# Patient Record
Sex: FEMALE | Race: OTHER | Employment: FULL TIME | URBAN - METROPOLITAN AREA
[De-identification: names, ages, dates, MRNs, and addresses within clinical notes are randomized per-mention and may not be internally consistent; named-entity substitution may affect disease eponyms.]

---

## 2017-05-16 LAB
AMB EXT HEMATOCRIT: 36.9
AMB EXT HEMOGLOBIN: 12.5
AMB EXT MCV: 92
AMB EXT PLATELETS: 273
ANTIBODY SCREEN OB: NEGATIVE
HEPATITIS B SURFACE ANTIGEN OB: NEGATIVE
HIV RESULT OB: NEGATIVE
RAPID PLASMA REAGIN OB: NONREACTIVE
RH FACTOR OB: POSITIVE

## 2017-09-13 ENCOUNTER — HOSPITAL ENCOUNTER (OUTPATIENT)
Facility: HOSPITAL | Age: 32
Setting detail: OBSERVATION
Discharge: HOME | DRG: 780 | End: 2017-09-14
Attending: OBSTETRICS & GYNECOLOGY | Admitting: OBSTETRICS & GYNECOLOGY
Payer: MEDICAID

## 2017-09-13 PROBLEM — O46.93 VAGINAL BLEEDING IN PREGNANCY, THIRD TRIMESTER: Status: ACTIVE | Noted: 2017-09-13

## 2017-09-13 PROBLEM — O47.03 THREATENED PREMATURE LABOR IN THIRD TRIMESTER: Status: ACTIVE | Noted: 2017-09-13

## 2017-09-13 RX ORDER — SODIUM CHLORIDE, SODIUM LACTATE, POTASSIUM CHLORIDE, CALCIUM CHLORIDE 600; 310; 30; 20 MG/100ML; MG/100ML; MG/100ML; MG/100ML
INJECTION, SOLUTION INTRAVENOUS
Status: COMPLETED
Start: 2017-09-13 | End: 2017-09-13

## 2017-09-13 RX ORDER — SODIUM CHLORIDE 0.9 % (FLUSH) 0.9 %
10 SYRINGE (ML) INJECTION AS NEEDED
Status: DISCONTINUED | OUTPATIENT
Start: 2017-09-13 | End: 2017-09-14

## 2017-09-13 RX ORDER — TERBUTALINE SULFATE 1 MG/ML
0.25 INJECTION, SOLUTION SUBCUTANEOUS AS NEEDED
Status: DISCONTINUED | OUTPATIENT
Start: 2017-09-13 | End: 2017-09-14

## 2017-09-13 RX ORDER — SODIUM CHLORIDE 9 MG/ML
INJECTION, SOLUTION INTRAVENOUS CONTINUOUS
Status: DISCONTINUED | OUTPATIENT
Start: 2017-09-13 | End: 2017-09-13

## 2017-09-13 RX ORDER — DEXTROSE, SODIUM CHLORIDE, SODIUM LACTATE, POTASSIUM CHLORIDE, AND CALCIUM CHLORIDE 5; .6; .31; .03; .02 G/100ML; G/100ML; G/100ML; G/100ML; G/100ML
125 INJECTION, SOLUTION INTRAVENOUS CONTINUOUS
Status: DISCONTINUED | OUTPATIENT
Start: 2017-09-14 | End: 2017-09-14

## 2017-09-14 VITALS
DIASTOLIC BLOOD PRESSURE: 49 MMHG | HEART RATE: 72 BPM | SYSTOLIC BLOOD PRESSURE: 109 MMHG | RESPIRATION RATE: 18 BRPM | TEMPERATURE: 98 F

## 2017-09-14 LAB
APTT PPP: 31.4 SECONDS (ref 23.2–35.3)
BILIRUB UR QL: NEGATIVE
CLARITY UR: CLEAR
COLOR UR: YELLOW
ERYTHROCYTE [DISTWIDTH] IN BLOOD BY AUTOMATED COUNT: 12.8 % (ref 11–15)
FIBRINOGEN PPP-MCNC: 391 MG/DL (ref 176–491)
FIBRONECTIN FETAL SPEC QL: NEGATIVE
FSP PPP LA-ACNC: NEGATIVE MCG/ML
GLUCOSE UR-MCNC: NEGATIVE MG/DL
HCT VFR BLD AUTO: 35.6 % (ref 35–48)
HGB BLD-MCNC: 11.9 G/DL (ref 12–16)
INR BLD: 1 (ref 0.9–1.2)
KETONES UR-MCNC: NEGATIVE MG/DL
MCH RBC QN AUTO: 31.8 PG (ref 27–32)
MCHC RBC AUTO-ENTMCNC: 33.4 G/DL (ref 32–37)
MCV RBC AUTO: 95.2 FL (ref 80–100)
NITRITE UR QL STRIP.AUTO: NEGATIVE
PH UR: 7 [PH] (ref 5–8)
PLATELET # BLD AUTO: 218 K/UL (ref 140–400)
PMV BLD AUTO: 9.1 FL (ref 7.4–10.3)
PROT UR-MCNC: NEGATIVE MG/DL
PROTHROMBIN TIME: 12.4 SECONDS (ref 11.8–14.5)
RBC # BLD AUTO: 3.74 M/UL (ref 3.7–5.4)
RBC #/AREA URNS AUTO: 3 /HPF
SP GR UR STRIP: 1.01 (ref 1–1.03)
UROBILINOGEN UR STRIP-ACNC: <2
VIT C UR-MCNC: NEGATIVE MG/DL
WBC # BLD AUTO: 10.9 K/UL (ref 4–11)
WBC #/AREA URNS AUTO: 4 /HPF

## 2017-09-14 PROCEDURE — 87653 STREP B DNA AMP PROBE: CPT | Performed by: OBSTETRICS & GYNECOLOGY

## 2017-09-14 PROCEDURE — 82731 ASSAY OF FETAL FIBRONECTIN: CPT | Performed by: OBSTETRICS & GYNECOLOGY

## 2017-09-14 PROCEDURE — 81001 URINALYSIS AUTO W/SCOPE: CPT | Performed by: OBSTETRICS & GYNECOLOGY

## 2017-09-14 PROCEDURE — 87086 URINE CULTURE/COLONY COUNT: CPT | Performed by: OBSTETRICS & GYNECOLOGY

## 2017-09-14 PROCEDURE — 85384 FIBRINOGEN ACTIVITY: CPT | Performed by: OBSTETRICS & GYNECOLOGY

## 2017-09-14 PROCEDURE — 87081 CULTURE SCREEN ONLY: CPT | Performed by: OBSTETRICS & GYNECOLOGY

## 2017-09-14 PROCEDURE — 85027 COMPLETE CBC AUTOMATED: CPT | Performed by: OBSTETRICS & GYNECOLOGY

## 2017-09-14 PROCEDURE — 85610 PROTHROMBIN TIME: CPT | Performed by: OBSTETRICS & GYNECOLOGY

## 2017-09-14 PROCEDURE — 85362 FIBRIN DEGRADATION PRODUCTS: CPT | Performed by: OBSTETRICS & GYNECOLOGY

## 2017-09-14 PROCEDURE — 85730 THROMBOPLASTIN TIME PARTIAL: CPT | Performed by: OBSTETRICS & GYNECOLOGY

## 2017-09-14 RX ORDER — SULFAMETHOXAZOLE AND TRIMETHOPRIM 800; 160 MG/1; MG/1
1 TABLET ORAL 2 TIMES DAILY
Qty: 6 TABLET | Refills: 0 | Status: ON HOLD | OUTPATIENT
Start: 2017-09-14 | End: 2017-11-23

## 2017-09-14 NOTE — H&P
[facility]    History & Physical    Brent Ambrosio Patient Status:  Observation    3/7/1985 MRN [id number]   Location [address]  Attending Beather Cooks, MD   Hosp Day # 0 PCP No primary care provider on file. abnormal masses, fundus soft, nontender    28 weeks size. Extremities: Calves -- nontender   Neuro:    Cervix: Speculum exam: blood noted on labia, no laceration/lesion. No blood noted in vagina.  FFN and GBS done  SVE: dilation FT ext os, int os not felt

## 2017-09-14 NOTE — PROGRESS NOTES
Dr. Mathew Jennings evaluated patient; order received for discharge; no active or visual bleeding noted upon examination per Dr. Mathew Jennings. Instructions given on  precautions.

## 2017-09-14 NOTE — PROGRESS NOTES
[facility]    Labor Progress Note    Ching Silveira Patient Status:  Inpatient    3/7/1985 MRN [id number]   Location [address]  Attending Suhas Acosta MD   Hosp Day # 0 PCP No primary care provider on file.

## 2017-11-23 ENCOUNTER — HOSPITAL ENCOUNTER (OUTPATIENT)
Facility: HOSPITAL | Age: 32
Discharge: HOME | End: 2017-11-23
Attending: OBSTETRICS & GYNECOLOGY | Admitting: OBSTETRICS & GYNECOLOGY
Payer: MEDICAID

## 2017-11-23 VITALS
HEART RATE: 71 BPM | TEMPERATURE: 98 F | HEIGHT: 65 IN | SYSTOLIC BLOOD PRESSURE: 114 MMHG | BODY MASS INDEX: 25.33 KG/M2 | WEIGHT: 152 LBS | RESPIRATION RATE: 18 BRPM | DIASTOLIC BLOOD PRESSURE: 64 MMHG

## 2017-11-23 PROBLEM — Z34.90 PREGNANCY: Status: ACTIVE | Noted: 2017-11-23

## 2017-11-23 PROCEDURE — 59025 FETAL NON-STRESS TEST: CPT

## 2017-11-23 PROCEDURE — 99211 OFF/OP EST MAY X REQ PHY/QHP: CPT

## 2017-11-23 RX ORDER — LEVOTHYROXINE SODIUM 0.03 MG/1
25 TABLET ORAL
COMMUNITY

## 2017-11-23 NOTE — TRIAGE
[facility]      Triage Note    Mali Garcia Patient Status:  Outpatient    3/7/1985 MRN [id number]   Location [address]  Attending Mary, [address] [place] [address] Day # 0 PCP No primary care provider on file. intense now. Patient was concerned because she saw a small spot of blood when she wiped earlier. No blood visible now and patient did not wear a pad into the hospital.  called and made aware of patient status.  Orders received to send patient

## 2017-11-24 ENCOUNTER — ANESTHESIA (OUTPATIENT)
Dept: OBGYN UNIT | Facility: HOSPITAL | Age: 32
End: 2017-11-24
Payer: MEDICAID

## 2017-11-24 ENCOUNTER — HOSPITAL ENCOUNTER (INPATIENT)
Facility: HOSPITAL | Age: 32
LOS: 2 days | Discharge: HOME | End: 2017-11-26
Attending: OBSTETRICS & GYNECOLOGY | Admitting: OBSTETRICS & GYNECOLOGY
Payer: MEDICAID

## 2017-11-24 ENCOUNTER — ANESTHESIA EVENT (OUTPATIENT)
Dept: OBGYN UNIT | Facility: HOSPITAL | Age: 32
End: 2017-11-24
Payer: MEDICAID

## 2017-11-24 PROBLEM — O47.9 UTERINE CONTRACTIONS DURING PREGNANCY: Status: ACTIVE | Noted: 2017-11-24

## 2017-11-24 PROCEDURE — 85027 COMPLETE CBC AUTOMATED: CPT | Performed by: OBSTETRICS & GYNECOLOGY

## 2017-11-24 PROCEDURE — 36415 COLL VENOUS BLD VENIPUNCTURE: CPT

## 2017-11-24 PROCEDURE — 10907ZC DRAINAGE OF AMNIOTIC FLUID, THERAPEUTIC FROM PRODUCTS OF CONCEPTION, VIA NATURAL OR ARTIFICIAL OPENING: ICD-10-PCS | Performed by: OBSTETRICS & GYNECOLOGY

## 2017-11-24 PROCEDURE — 86850 RBC ANTIBODY SCREEN: CPT | Performed by: OBSTETRICS & GYNECOLOGY

## 2017-11-24 PROCEDURE — 86901 BLOOD TYPING SEROLOGIC RH(D): CPT | Performed by: OBSTETRICS & GYNECOLOGY

## 2017-11-24 PROCEDURE — 86780 TREPONEMA PALLIDUM: CPT | Performed by: OBSTETRICS & GYNECOLOGY

## 2017-11-24 PROCEDURE — 0KQM0ZZ REPAIR PERINEUM MUSCLE, OPEN APPROACH: ICD-10-PCS | Performed by: OBSTETRICS & GYNECOLOGY

## 2017-11-24 PROCEDURE — 51702 INSERT TEMP BLADDER CATH: CPT

## 2017-11-24 PROCEDURE — 86900 BLOOD TYPING SEROLOGIC ABO: CPT | Performed by: OBSTETRICS & GYNECOLOGY

## 2017-11-24 RX ORDER — TERBUTALINE SULFATE 1 MG/ML
0.25 INJECTION, SOLUTION SUBCUTANEOUS AS NEEDED
Status: DISCONTINUED | OUTPATIENT
Start: 2017-11-24 | End: 2017-11-24 | Stop reason: HOSPADM

## 2017-11-24 RX ORDER — BUPIVACAINE HYDROCHLORIDE 2.5 MG/ML
INJECTION, SOLUTION EPIDURAL; INFILTRATION; INTRACAUDAL AS NEEDED
Status: DISCONTINUED | OUTPATIENT
Start: 2017-11-24 | End: 2017-11-24 | Stop reason: SURG

## 2017-11-24 RX ORDER — ONDANSETRON 2 MG/ML
4 INJECTION INTRAMUSCULAR; INTRAVENOUS EVERY 6 HOURS PRN
Status: DISCONTINUED | OUTPATIENT
Start: 2017-11-24 | End: 2017-11-26

## 2017-11-24 RX ORDER — SODIUM CHLORIDE, SODIUM LACTATE, POTASSIUM CHLORIDE, CALCIUM CHLORIDE 600; 310; 30; 20 MG/100ML; MG/100ML; MG/100ML; MG/100ML
INJECTION, SOLUTION INTRAVENOUS
Status: COMPLETED
Start: 2017-11-24 | End: 2017-11-24

## 2017-11-24 RX ORDER — LIDOCAINE HYDROCHLORIDE 10 MG/ML
30 INJECTION, SOLUTION EPIDURAL; INFILTRATION; INTRACAUDAL; PERINEURAL ONCE
Status: DISCONTINUED | OUTPATIENT
Start: 2017-11-24 | End: 2017-11-24 | Stop reason: HOSPADM

## 2017-11-24 RX ORDER — DOCUSATE SODIUM 100 MG/1
100 CAPSULE, LIQUID FILLED ORAL
Status: DISCONTINUED | OUTPATIENT
Start: 2017-11-24 | End: 2017-11-26

## 2017-11-24 RX ORDER — SIMETHICONE 80 MG
80 TABLET,CHEWABLE ORAL 3 TIMES DAILY PRN
Status: DISCONTINUED | OUTPATIENT
Start: 2017-11-24 | End: 2017-11-26

## 2017-11-24 RX ORDER — EPHEDRINE SULFATE/0.9% NACL/PF 25 MG/5 ML
SYRINGE (ML) INTRAVENOUS
Status: COMPLETED
Start: 2017-11-24 | End: 2017-11-24

## 2017-11-24 RX ORDER — PHENYLEPHRINE HCL IN 0.9% NACL 0.5 MG/5ML
SYRINGE (ML) INTRAVENOUS
Status: DISCONTINUED
Start: 2017-11-24 | End: 2017-11-24 | Stop reason: WASHOUT

## 2017-11-24 RX ORDER — SODIUM CHLORIDE 0.9 % (FLUSH) 0.9 %
10 SYRINGE (ML) INJECTION AS NEEDED
Status: DISCONTINUED | OUTPATIENT
Start: 2017-11-24 | End: 2017-11-24 | Stop reason: ALTCHOICE

## 2017-11-24 RX ORDER — AMMONIA INHALANTS 0.04 G/.3ML
0.3 INHALANT RESPIRATORY (INHALATION) AS NEEDED
Status: DISCONTINUED | OUTPATIENT
Start: 2017-11-24 | End: 2017-11-26

## 2017-11-24 RX ORDER — IBUPROFEN 400 MG/1
200 TABLET ORAL EVERY 4 HOURS PRN
Status: DISCONTINUED | OUTPATIENT
Start: 2017-11-24 | End: 2017-11-26

## 2017-11-24 RX ORDER — BISACODYL 10 MG
10 SUPPOSITORY, RECTAL RECTAL ONCE AS NEEDED
Status: ACTIVE | OUTPATIENT
Start: 2017-11-24 | End: 2017-11-24

## 2017-11-24 RX ORDER — AMMONIA INHALANTS 0.04 G/.3ML
0.3 INHALANT RESPIRATORY (INHALATION) AS NEEDED
Status: DISCONTINUED | OUTPATIENT
Start: 2017-11-24 | End: 2017-11-24 | Stop reason: ALTCHOICE

## 2017-11-24 RX ORDER — SODIUM CHLORIDE, SODIUM LACTATE, POTASSIUM CHLORIDE, CALCIUM CHLORIDE 600; 310; 30; 20 MG/100ML; MG/100ML; MG/100ML; MG/100ML
INJECTION, SOLUTION INTRAVENOUS CONTINUOUS
Status: DISCONTINUED | OUTPATIENT
Start: 2017-11-24 | End: 2017-11-24 | Stop reason: HOSPADM

## 2017-11-24 RX ORDER — SODIUM CHLORIDE 0.9 % (FLUSH) 0.9 %
10 SYRINGE (ML) INJECTION AS NEEDED
Status: DISCONTINUED | OUTPATIENT
Start: 2017-11-24 | End: 2017-11-26

## 2017-11-24 RX ORDER — DIAPER,BRIEF,INFANT-TODD,DISP
1 EACH MISCELLANEOUS EVERY 6 HOURS PRN
Status: DISCONTINUED | OUTPATIENT
Start: 2017-11-24 | End: 2017-11-26

## 2017-11-24 RX ORDER — LIDOCAINE HYDROCHLORIDE AND EPINEPHRINE 15; 5 MG/ML; UG/ML
INJECTION, SOLUTION EPIDURAL AS NEEDED
Status: DISCONTINUED | OUTPATIENT
Start: 2017-11-24 | End: 2017-11-24 | Stop reason: SURG

## 2017-11-24 RX ORDER — IBUPROFEN 600 MG/1
600 TABLET ORAL ONCE AS NEEDED
Status: DISCONTINUED | OUTPATIENT
Start: 2017-11-24 | End: 2017-11-24 | Stop reason: ALTCHOICE

## 2017-11-24 RX ORDER — LIDOCAINE HYDROCHLORIDE 10 MG/ML
INJECTION, SOLUTION EPIDURAL; INFILTRATION; INTRACAUDAL; PERINEURAL AS NEEDED
Status: DISCONTINUED | OUTPATIENT
Start: 2017-11-24 | End: 2017-11-24 | Stop reason: SURG

## 2017-11-24 RX ORDER — LIDOCAINE HYDROCHLORIDE AND EPINEPHRINE 20; 5 MG/ML; UG/ML
INJECTION, SOLUTION EPIDURAL; INFILTRATION; INTRACAUDAL; PERINEURAL
Status: DISCONTINUED
Start: 2017-11-24 | End: 2017-11-24 | Stop reason: WASHOUT

## 2017-11-24 RX ORDER — IBUPROFEN 600 MG/1
600 TABLET ORAL EVERY 4 HOURS PRN
Status: DISCONTINUED | OUTPATIENT
Start: 2017-11-24 | End: 2017-11-26

## 2017-11-24 RX ORDER — LEVOTHYROXINE SODIUM 0.03 MG/1
25 TABLET ORAL
Status: DISCONTINUED | OUTPATIENT
Start: 2017-11-24 | End: 2017-11-26

## 2017-11-24 RX ORDER — PRENATAL VIT,CAL 76/IRON/FOLIC 29 MG-1 MG
1 TABLET ORAL DAILY
Status: DISCONTINUED | OUTPATIENT
Start: 2017-11-25 | End: 2017-11-26

## 2017-11-24 RX ORDER — NALBUPHINE HCL 10 MG/ML
2.5 AMPUL (ML) INJECTION
Status: DISCONTINUED | OUTPATIENT
Start: 2017-11-24 | End: 2017-11-24 | Stop reason: HOSPADM

## 2017-11-24 RX ORDER — DEXTROSE, SODIUM CHLORIDE, SODIUM LACTATE, POTASSIUM CHLORIDE, AND CALCIUM CHLORIDE 5; .6; .31; .03; .02 G/100ML; G/100ML; G/100ML; G/100ML; G/100ML
125 INJECTION, SOLUTION INTRAVENOUS CONTINUOUS
Status: DISCONTINUED | OUTPATIENT
Start: 2017-11-24 | End: 2017-11-24 | Stop reason: HOSPADM

## 2017-11-24 RX ORDER — EPHEDRINE SULFATE/0.9% NACL/PF 25 MG/5 ML
5 SYRINGE (ML) INTRAVENOUS AS NEEDED
Status: DISCONTINUED | OUTPATIENT
Start: 2017-11-24 | End: 2017-11-24 | Stop reason: HOSPADM

## 2017-11-24 RX ORDER — IBUPROFEN 400 MG/1
400 TABLET ORAL EVERY 4 HOURS PRN
Status: DISCONTINUED | OUTPATIENT
Start: 2017-11-24 | End: 2017-11-26

## 2017-11-24 RX ORDER — BUPIVACAINE HYDROCHLORIDE 2.5 MG/ML
INJECTION, SOLUTION EPIDURAL; INFILTRATION; INTRACAUDAL
Status: COMPLETED
Start: 2017-11-24 | End: 2017-11-24

## 2017-11-24 RX ORDER — TRISODIUM CITRATE DIHYDRATE AND CITRIC ACID MONOHYDRATE 500; 334 MG/5ML; MG/5ML
30 SOLUTION ORAL AS NEEDED
Status: DISCONTINUED | OUTPATIENT
Start: 2017-11-24 | End: 2017-11-24 | Stop reason: HOSPADM

## 2017-11-24 RX ADMIN — LIDOCAINE HYDROCHLORIDE AND EPINEPHRINE 5 ML: 15; 5 INJECTION, SOLUTION EPIDURAL at 10:13:00

## 2017-11-24 RX ADMIN — LIDOCAINE HYDROCHLORIDE 3 ML: 10 INJECTION, SOLUTION EPIDURAL; INFILTRATION; INTRACAUDAL; PERINEURAL at 10:09:00

## 2017-11-24 RX ADMIN — BUPIVACAINE HYDROCHLORIDE 10 ML: 2.5 INJECTION, SOLUTION EPIDURAL; INFILTRATION; INTRACAUDAL at 10:15:00

## 2017-11-24 NOTE — PROGRESS NOTES
Language line used prior to and during epidural procedure for MD and RN education; all questions answered

## 2017-11-24 NOTE — PROGRESS NOTES
[facility]    Labor Progress Note    Bruceyasmeen Valenzuelau Patient Status:  Inpatient    3/7/1985 MRN [id number]   Location [address] [facility] Attending Mary, [address] [place] [address] Day # 0 PCP No primary care provider on ismael

## 2017-11-24 NOTE — PLAN OF CARE
Patient Centered Care    • Patient preferences are identified and integrated in the patient's plan of care Progressing        Patient/Family Goals    • Patient/Family Long Term Goal Progressing    • Patient/Family Short Term Goal Progressing

## 2017-11-24 NOTE — ANESTHESIA PREPROCEDURE EVALUATION
Anesthesia PreOp Note    HPI:     Elham Avitia is a 34year old female who presents for preoperative consultation requested by: * No surgeons listed *    Date of Surgery: 11/24/2017    * No procedures listed *  Indication: * No pre-op diagnosis entered * Della Ferrell MD     lactated ringers infusion  Intravenous Continuous MaryEddi MD Stopped at 11/24/17 0950    fentaNYL citrate (SUBLIMAZE) 0.05 MG/ML injection 100 mcg 100 mcg Intravenous Once PRN Della Ferrell MD     fe MPV 8.3 11/24/2017             Vital Signs: Body mass index is 25.29 kg/m². height is 1.651 m (5' 5\") and weight is 68.9 kg (152 lb). Her oral temperature is 99 °F (37.2 °C). Her blood pressure is 109/64 and her pulse is 68. Her respiration is 16.

## 2017-11-24 NOTE — ANESTHESIA PROCEDURE NOTES
Labor Analgesia  Performed by: Marilia Sep by: Rod Swain     Patient Location:  OB  Start Time:  11/24/2017 10:10 AM  End Time:  11/24/2017 10:17 AM  Site Identification: surface landmarks    Reason for Block: labor epidural

## 2017-11-24 NOTE — H&P
[facility]    History & Physical    Mj Old Patient Status:  Inpatient    3/7/1985 MRN [id number]   Location [address]  Attending Mary, [address] [place] [address] Day # 0 PCP No primary care provider on file Heart Rate accelerations: yes  Baseline FHR: 140 per minute  Uterine contractions: regular, every q5 minutes    Lab Review:   A, Rh+, Rubella-immune, Hepatitis B surface antigen non-reactive, Rub NI, RPR NR, HIV NR, GTT WNL.  GBS negative    Strep B Culture

## 2017-11-25 PROCEDURE — 85025 COMPLETE CBC W/AUTO DIFF WBC: CPT | Performed by: OBSTETRICS & GYNECOLOGY

## 2017-11-25 NOTE — DISCHARGE SUMMARY
[facility]    Discharge Summary    Sonny Ramos Patient Status:  Inpatient    3/7/1985 MRN [id number]   Location [address] Attending Mary,  [address] Day # 0       Admission Date: 2017  D

## 2017-11-25 NOTE — PROGRESS NOTES
Post-Partum Note   11/25/2017, 8:43 AM    Subjective:  PPD #1  No complaints. Lochia normal. Voiding normal. Not dizzy. Mood good.    Breast    Objective:   11/24/17 1945 11/24/17 2011 11/24/17 2233 11/25/17  0023   BP: 101/55 107/58 111/58 107/55   BP L

## 2017-11-25 NOTE — ANESTHESIA POSTPROCEDURE EVALUATION
Patient: Elham Avitia    Procedure Summary     Date:  11/24/17 Room / Location:      Anesthesia Start:  1000 Anesthesia Stop:  1509    Procedure:  LABOR ANALGESIA Diagnosis:      Scheduled Providers:   Anesthesiologist:  Jayden Spencer MD    Anesthesia

## 2017-11-25 NOTE — L&D DELIVERY NOTE
[facility]    Vaginal Delivery Note    Oma Larger Patient Status:  Inpatient    3/7/1985 MRN [id number]   Location @[facility] Attending Mary, [address] [place] [address] Street Day # 0 PCP No primary care provider on file.      Delivery     Infant

## 2017-11-25 NOTE — LACTATION NOTE
LACTATION NOTE - MOTHER      Evaluation Type: Inpatient    Problems identified  Problems identified: Knowledge deficit    Maternal history  Maternal history:  section; Hypothyroid         Maternal Assessment  Bilateral Breasts: Soft  Bilateral Nippl

## 2017-11-25 NOTE — LACTATION NOTE
This note was copied from a baby's chart.   LACTATION NOTE - INFANT    Evaluation Type  Evaluation Type: Inpatient    Problems & Assessment  Problems Diagnosed or Identified: Latch difficulty;Sleepy  Infant Assessment: Hunger cues present;Skin color: pink o

## 2017-11-26 VITALS
HEART RATE: 89 BPM | HEIGHT: 65 IN | BODY MASS INDEX: 25.33 KG/M2 | SYSTOLIC BLOOD PRESSURE: 102 MMHG | RESPIRATION RATE: 16 BRPM | DIASTOLIC BLOOD PRESSURE: 57 MMHG | WEIGHT: 152 LBS | TEMPERATURE: 98 F

## 2017-11-26 NOTE — PROGRESS NOTES
[place] [facility] - [facility]    OB/GYNE Progress Note      Rosa Elena Savage Patient Status:  Inpatient    3/7/1985 MRN [id number]   Location [facility] Attending Mary, [address] Day # 2 PCP No primary care provider on file.        Ass